# Patient Record
Sex: MALE | Race: WHITE
[De-identification: names, ages, dates, MRNs, and addresses within clinical notes are randomized per-mention and may not be internally consistent; named-entity substitution may affect disease eponyms.]

---

## 2019-11-11 ENCOUNTER — HOSPITAL ENCOUNTER (OUTPATIENT)
Dept: HOSPITAL 96 - M.MRI | Age: 54
End: 2019-11-11
Attending: FAMILY MEDICINE
Payer: COMMERCIAL

## 2019-11-11 DIAGNOSIS — M40.46: Primary | ICD-10-CM

## 2019-11-11 DIAGNOSIS — M51.16: ICD-10-CM

## 2019-11-11 DIAGNOSIS — M48.061: ICD-10-CM

## 2019-11-27 ENCOUNTER — HOSPITAL ENCOUNTER (OUTPATIENT)
Dept: HOSPITAL 96 - M.PC | Age: 54
End: 2019-11-27
Attending: PHYSICAL MEDICINE & REHABILITATION
Payer: COMMERCIAL

## 2019-11-27 DIAGNOSIS — M48.061: ICD-10-CM

## 2019-11-27 DIAGNOSIS — M47.26: Primary | ICD-10-CM

## 2019-11-27 DIAGNOSIS — M51.16: ICD-10-CM

## 2020-01-25 ENCOUNTER — HOSPITAL ENCOUNTER (EMERGENCY)
Dept: HOSPITAL 96 - M.ERS | Age: 55
Discharge: HOME | End: 2020-01-25
Payer: COMMERCIAL

## 2020-01-25 VITALS — SYSTOLIC BLOOD PRESSURE: 147 MMHG | DIASTOLIC BLOOD PRESSURE: 96 MMHG

## 2020-01-25 VITALS — HEIGHT: 68 IN | BODY MASS INDEX: 32.58 KG/M2 | WEIGHT: 214.99 LBS

## 2020-01-25 DIAGNOSIS — R42: ICD-10-CM

## 2020-01-25 DIAGNOSIS — G89.29: ICD-10-CM

## 2020-01-25 DIAGNOSIS — R07.89: Primary | ICD-10-CM

## 2020-01-25 DIAGNOSIS — F17.220: ICD-10-CM

## 2020-01-25 LAB
ABSOLUTE BASOPHILS: 0 THOU/UL (ref 0–0.2)
ABSOLUTE EOSINOPHILS: 0.1 THOU/UL (ref 0–0.7)
ABSOLUTE MONOCYTES: 0.4 THOU/UL (ref 0–1.2)
ALBUMIN SERPL-MCNC: 3.7 G/DL (ref 3.4–5)
ALP SERPL-CCNC: 81 U/L (ref 46–116)
ALT SERPL-CCNC: 33 U/L (ref 30–65)
ANION GAP SERPL CALC-SCNC: 9 MMOL/L (ref 7–16)
APTT BLD: 27.3 SECONDS (ref 25–31.3)
AST SERPL-CCNC: 18 U/L (ref 15–37)
BASOPHILS NFR BLD AUTO: 0.8 %
BILIRUB SERPL-MCNC: 0.7 MG/DL
BUN SERPL-MCNC: 18 MG/DL (ref 7–18)
CALCIUM SERPL-MCNC: 8.2 MG/DL (ref 8.5–10.1)
CHLORIDE SERPL-SCNC: 105 MMOL/L (ref 98–107)
CK-MB MASS: 1.1 NG/ML
CO2 SERPL-SCNC: 28 MMOL/L (ref 21–32)
CREAT SERPL-MCNC: 1.2 MG/DL (ref 0.6–1.3)
EOSINOPHIL NFR BLD: 3.2 %
GLUCOSE SERPL-MCNC: 138 MG/DL (ref 70–99)
GRANULOCYTES NFR BLD MANUAL: 53.7 %
HCT VFR BLD CALC: 42.7 % (ref 42–52)
HGB BLD-MCNC: 15.1 GM/DL (ref 14–18)
INR PPP: 1
LIPASE: 106 U/L (ref 73–393)
LYMPHOCYTES # BLD: 1.3 THOU/UL (ref 0.8–5.3)
LYMPHOCYTES NFR BLD AUTO: 32.6 %
MAGNESIUM SERPL-MCNC: 1.9 MG/DL (ref 1.8–2.4)
MCH RBC QN AUTO: 30.7 PG (ref 26–34)
MCHC RBC AUTO-ENTMCNC: 35.4 G/DL (ref 28–37)
MCV RBC: 86.8 FL (ref 80–100)
MONOCYTES NFR BLD: 9.7 %
MPV: 9.1 FL. (ref 7.2–11.1)
NEUTROPHILS # BLD: 2.2 THOU/UL (ref 1.6–8.1)
NT-PRO BRAIN NAT PEPTIDE: 42 PG/ML (ref ?–300)
NUCLEATED RBCS: 0 /100WBC
PLATELET COUNT*: 218 THOU/UL (ref 150–400)
POTASSIUM SERPL-SCNC: 4.3 MMOL/L (ref 3.5–5.1)
PROT SERPL-MCNC: 6.6 G/DL (ref 6.4–8.2)
PROTHROMBIN TIME: 10.7 SECONDS (ref 9.2–11.5)
RBC # BLD AUTO: 4.92 MIL/UL (ref 4.5–6)
RDW-CV: 13.4 % (ref 10.5–14.5)
SODIUM SERPL-SCNC: 142 MMOL/L (ref 136–145)
WBC # BLD AUTO: 4.1 THOU/UL (ref 4–11)

## 2020-01-26 NOTE — EKG
[place] [address]
Phone:  [phone]                     ELECTROCARDIOGRAM REPORT      
_______________________________________________________________________________
 
Name:       BREEJORDAN FISHER           Room:                      [facility].#:  [id number]      Account #:      [id number]  
Admission:  20     Attend Phys:                         
Discharge:  20     Date of Birth:  [birth year]  
         Report #: [id number]
    [id number]
_______________________________________________________________________________
THIS REPORT FOR:  //name//                      
 
                         [facility] ED
                                       
Test Date:    2020               Test Time:    09:16:06
Pat Name:     JORDAN GIRON           Department:   
Patient ID:   [id number]            Room:          
Gender:       M                        Technician:   [place]
:          1965               Requested By: Allan Mcmahan
Order Number: [id number] MD:   Андрей Valderrama
                                 Measurements
Intervals                              Axis          
Rate:         77                       P:            11
NV:           135                      QRS:          1
QRSD:         83                       T:            1
QT:           350                                    
QTc:          397                                    
                           Interpretive Statements
Sinus rhythm
Ventricular premature complex
ST elev, probable normal early repol pattern
Baseline wander in lead(s) I,III,aVR,aVL,aVF
Compared to ECG 2017 18:16:44
ST (T wave) deviation now present
 
Electronically Signed On 2020 16:44:05 CST by Андрей Valderrama
[email or web address]
 
 
 
 
 
 
 
 
 
 
 
 
 
 
 
 
  <ELECTRONICALLY SIGNED>
                                           By: Андрей Valderrama MD, FACC   
  20     1644
D: 20   _____________________________________
T: 20   Андрей Valderrama MD, [facility]     /EPI

## 2020-09-08 ENCOUNTER — HOSPITAL ENCOUNTER (EMERGENCY)
Dept: HOSPITAL 96 - M.ERS | Age: 55
Discharge: HOME | End: 2020-09-08
Payer: COMMERCIAL

## 2020-09-08 VITALS — SYSTOLIC BLOOD PRESSURE: 129 MMHG | DIASTOLIC BLOOD PRESSURE: 79 MMHG

## 2020-09-08 VITALS — HEIGHT: 70 IN | WEIGHT: 172 LBS | BODY MASS INDEX: 24.62 KG/M2

## 2020-09-08 DIAGNOSIS — Z20.828: ICD-10-CM

## 2020-09-08 DIAGNOSIS — G89.29: ICD-10-CM

## 2020-09-08 DIAGNOSIS — F17.200: ICD-10-CM

## 2020-09-08 DIAGNOSIS — R07.89: Primary | ICD-10-CM

## 2020-09-08 LAB
ABSOLUTE BASOPHILS: 0 THOU/UL (ref 0–0.2)
ABSOLUTE EOSINOPHILS: 0.1 THOU/UL (ref 0–0.7)
ABSOLUTE MONOCYTES: 0.4 THOU/UL (ref 0–1.2)
ALBUMIN SERPL-MCNC: 3.7 G/DL (ref 3.4–5)
ALP SERPL-CCNC: 70 U/L (ref 46–116)
ALT SERPL-CCNC: 30 U/L (ref 30–65)
ANION GAP SERPL CALC-SCNC: 6 MMOL/L (ref 7–16)
APTT BLD: 28.3 SECONDS (ref 25–31.3)
AST SERPL-CCNC: 18 U/L (ref 15–37)
BASOPHILS NFR BLD AUTO: 0.8 %
BILIRUB SERPL-MCNC: 0.8 MG/DL
BUN SERPL-MCNC: 14 MG/DL (ref 7–18)
CALCIUM SERPL-MCNC: 7.8 MG/DL (ref 8.5–10.1)
CHLORIDE SERPL-SCNC: 104 MMOL/L (ref 98–107)
CO2 SERPL-SCNC: 27 MMOL/L (ref 21–32)
CREAT SERPL-MCNC: 1 MG/DL (ref 0.6–1.3)
EOSINOPHIL NFR BLD: 3.4 %
GLUCOSE SERPL-MCNC: 128 MG/DL (ref 70–99)
GRANULOCYTES NFR BLD MANUAL: 61.1 %
HCT VFR BLD CALC: 44.6 % (ref 42–52)
HGB BLD-MCNC: 15.8 GM/DL (ref 14–18)
INR PPP: 1
LIPASE: 109 U/L (ref 73–393)
LYMPHOCYTES # BLD: 1.1 THOU/UL (ref 0.8–5.3)
LYMPHOCYTES NFR BLD AUTO: 26.3 %
MCH RBC QN AUTO: 30.8 PG (ref 26–34)
MCHC RBC AUTO-ENTMCNC: 35.5 G/DL (ref 28–37)
MCV RBC: 86.7 FL (ref 80–100)
MONOCYTES NFR BLD: 8.4 %
MPV: 8.6 FL. (ref 7.2–11.1)
NEUTROPHILS # BLD: 2.6 THOU/UL (ref 1.6–8.1)
NUCLEATED RBCS: 0 /100WBC
PLATELET COUNT*: 205 THOU/UL (ref 150–400)
POTASSIUM SERPL-SCNC: 4 MMOL/L (ref 3.5–5.1)
PROT SERPL-MCNC: 6.6 G/DL (ref 6.4–8.2)
PROTHROMBIN TIME: 10.7 SECONDS (ref 9.2–11.5)
RBC # BLD AUTO: 5.14 MIL/UL (ref 4.5–6)
RDW-CV: 13.2 % (ref 10.5–14.5)
SODIUM SERPL-SCNC: 137 MMOL/L (ref 136–145)
WBC # BLD AUTO: 4.2 THOU/UL (ref 4–11)

## 2020-09-08 NOTE — EKG
[place] [address]
Phone:  [phone]                     ELECTROCARDIOGRAM REPORT      
_______________________________________________________________________________
 
Name:         JORDAN GIRON          Room:                     [facility] 
M.R.#:    [id number]     Account #:     [id number]  
Admission:    20    Attend Phys:                     
Discharge:                Date of Birth: [birth year]  
Date of Service: 20 0944  Report #:      [id number]
        [id number]
_______________________________________________________________________________
THIS REPORT FOR:  //name//                      
 
                         [facility] ED
                                       
Test Date:    2020               Test Time:    09:44:06
Pat Name:     JORDAN GIRON           Department:   
Patient ID:   [id number]            Room:          
Gender:                               Technician:   Jace Angeles
:          1965               Requested By: Lesly Woodard
Order Number: [id number] MD:   Андрей Valderrama
                                 Measurements
Intervals                              Axis          
Rate:         79                       P:            28
VT:           156                      QRS:          1
QRSD:         85                       T:            5
QT:           360                                    
QTc:          413                                    
                           Interpretive Statements
Sinus rhythm
Ventricular premature complex
Baseline wander in lead(s) II,III,aVF
Compared to ECG 2020 09:16:06
ST (T wave) deviation no longer present
Electronically Signed On 2020 14:34:20 CDT by Андрей Valderrama
[email or web address]
 
 
 
 
 
 
 
 
 
 
 
 
 
 
 
 
 
 
 
  <ELECTRONICALLY SIGNED>
                                           By: Андрей Valderrama MD, [facility]   
  20     1434
D: 20 0944   _____________________________________
T: 20 0944   Андрей Valderrama MD, [facility]     /EPI

## 2020-09-10 NOTE — TST
[place] [address]
Phone:  [phone]                     TREADMILL STRESS TEST         
_______________________________________________________________________________
 
Name:         JORDAN GIRON          Room:                     [facility]#:    [id number]     Account #:     [id number]  
Admission:    09/08/20    Attend Phys:                     
Discharge:    09/08/20    Date of Birth: [birth year]  
Date of Service: 09/08/20 1749  Report #:      [id number]
        [id number]     
_______________________________________________________________________________
THIS REPORT FOR:
 
cc:  Sheldon Wright, Андрей Redd MD [facility]     
                                                                       ~
CC: Sheldon Jean
 
DATE OF SERVICE:  09/08/2020
 
 
PROCEDURE:  Standard Chacho protocol exercise stress test.
 
INDICATION:  Chest pain and dyspnea.
 
PROCEDURE DESCRIPTION:  After informed consent was obtained, the patient was
brought to the cardiac catheterization holding area.  The patient underwent
stress testing utilizing the standard Chacho protocol.
 
The patient exercised for a total of 6 minutes and 36 seconds.  Exercise was
stopped due to dizziness and calf pain as well as achievement of target heart
rate.
 
The resting blood pressure was 121/97 mmHg with a resting pulse rate of 73 beats
per minute.  At peak stress, the blood pressure was 234/94 mmHg with a peak
stress heart rate of 153 beats per minute.  Recovery blood pressure was 154/94
mmHg with a recovery heart rate of 94 beats per minute.  The patient had a
normal heart rate response to exercise.  The patient had a hypertensive response
to exercise.  The patient achieved 90% of the age-predicted maximum heart rate.
 
The baseline 12-lead EKG shows sinus rhythm without significant ST segment or
T-wave abnormality.  EKGs obtained during and post-exercise shows sinus rhythm
and sinus tachycardia with no significant ST segment or T-wave changes when
compared to baseline.  There were no stress-induced arrhythmias.
 
The patient denied any significant chest pain, tightness or pressure with
exercise.
 
IMPRESSION:
1.  Clinical response, nonischemic.
2.  EKG response, nonischemic.
 
 
 
[place] [address]
Phone:  [phone]                     TREADMILL STRESS TEST         
_______________________________________________________________________________
 
Name:         JORDAN GIRON          Room:                     [facility]#:    [id number]     Account #:     [id number]  
Admission:    09/08/20    Attend Phys:                     
Discharge:    09/08/20    Date of Birth: [birth year]  
Date of Service: 09/08/20 1749  Report #:      [id number]
        [id number]     
_______________________________________________________________________________
CONCLUSION:  This standard Chacho protocol exercise stress test shows no EKG or
clinical evidence of stress-induced ischemia.  This is a low-risk study.
 
 
 
 
 
 
 
 
 
 
 
 
 
 
 
 
 
 
 
 
 
 
 
 
 
 
 
 
 
 
 
 
 
 
 
 
 
 
 
 
 
 
  <ELECTRONICALLY SIGNED>
                                           By: Андрей Valderrama MD, FACC   
  09/10/20     1242
D: 09/08/20 1749   _____________________________________
T: 09/08/20 1822   Андрей Valderrama MD, [facility]     /nt